# Patient Record
Sex: FEMALE | Race: WHITE | NOT HISPANIC OR LATINO | URBAN - METROPOLITAN AREA
[De-identification: names, ages, dates, MRNs, and addresses within clinical notes are randomized per-mention and may not be internally consistent; named-entity substitution may affect disease eponyms.]

---

## 2018-09-08 ENCOUNTER — EMERGENCY (EMERGENCY)
Facility: HOSPITAL | Age: 58
LOS: 1 days | Discharge: HOME | End: 2018-09-08
Attending: EMERGENCY MEDICINE | Admitting: EMERGENCY MEDICINE
Payer: SELF-PAY

## 2018-09-08 VITALS
HEART RATE: 73 BPM | DIASTOLIC BLOOD PRESSURE: 80 MMHG | OXYGEN SATURATION: 100 % | TEMPERATURE: 97 F | SYSTOLIC BLOOD PRESSURE: 145 MMHG | RESPIRATION RATE: 16 BRPM | WEIGHT: 134.92 LBS

## 2018-09-08 VITALS
SYSTOLIC BLOOD PRESSURE: 108 MMHG | HEART RATE: 72 BPM | OXYGEN SATURATION: 98 % | RESPIRATION RATE: 17 BRPM | DIASTOLIC BLOOD PRESSURE: 67 MMHG

## 2018-09-08 DIAGNOSIS — R20.0 ANESTHESIA OF SKIN: ICD-10-CM

## 2018-09-08 DIAGNOSIS — R07.89 OTHER CHEST PAIN: ICD-10-CM

## 2018-09-08 DIAGNOSIS — R20.2 PARESTHESIA OF SKIN: ICD-10-CM

## 2018-09-08 LAB
ALBUMIN SERPL ELPH-MCNC: 3.8 G/DL — SIGNIFICANT CHANGE UP (ref 3.4–5)
ALP SERPL-CCNC: 87 U/L — SIGNIFICANT CHANGE UP (ref 40–120)
ALT FLD-CCNC: 23 U/L — SIGNIFICANT CHANGE UP (ref 12–42)
ANION GAP SERPL CALC-SCNC: 8 MMOL/L — LOW (ref 9–16)
AST SERPL-CCNC: 17 U/L — SIGNIFICANT CHANGE UP (ref 15–37)
BASOPHILS NFR BLD AUTO: 0.9 % — SIGNIFICANT CHANGE UP (ref 0–2)
BILIRUB SERPL-MCNC: 0.3 MG/DL — SIGNIFICANT CHANGE UP (ref 0.2–1.2)
BUN SERPL-MCNC: 16 MG/DL — SIGNIFICANT CHANGE UP (ref 7–23)
CALCIUM SERPL-MCNC: 9 MG/DL — SIGNIFICANT CHANGE UP (ref 8.5–10.5)
CHLORIDE SERPL-SCNC: 106 MMOL/L — SIGNIFICANT CHANGE UP (ref 96–108)
CO2 SERPL-SCNC: 27 MMOL/L — SIGNIFICANT CHANGE UP (ref 22–31)
CREAT SERPL-MCNC: 0.84 MG/DL — SIGNIFICANT CHANGE UP (ref 0.5–1.3)
EOSINOPHIL NFR BLD AUTO: 2.7 % — SIGNIFICANT CHANGE UP (ref 0–6)
GLUCOSE SERPL-MCNC: 98 MG/DL — SIGNIFICANT CHANGE UP (ref 70–99)
HCT VFR BLD CALC: 39.7 % — SIGNIFICANT CHANGE UP (ref 34.5–45)
HGB BLD-MCNC: 13.4 G/DL — SIGNIFICANT CHANGE UP (ref 11.5–15.5)
IMM GRANULOCYTES NFR BLD AUTO: 0.1 % — SIGNIFICANT CHANGE UP (ref 0–1.5)
LYMPHOCYTES # BLD AUTO: 25.9 % — SIGNIFICANT CHANGE UP (ref 13–44)
MCHC RBC-ENTMCNC: 30.8 PG — SIGNIFICANT CHANGE UP (ref 27–34)
MCHC RBC-ENTMCNC: 33.8 G/DL — SIGNIFICANT CHANGE UP (ref 32–36)
MCV RBC AUTO: 91.3 FL — SIGNIFICANT CHANGE UP (ref 80–100)
MONOCYTES NFR BLD AUTO: 6.6 % — SIGNIFICANT CHANGE UP (ref 2–14)
NEUTROPHILS NFR BLD AUTO: 63.8 % — SIGNIFICANT CHANGE UP (ref 43–77)
PLATELET # BLD AUTO: 307 K/UL — SIGNIFICANT CHANGE UP (ref 150–400)
POTASSIUM SERPL-MCNC: 3.9 MMOL/L — SIGNIFICANT CHANGE UP (ref 3.5–5.3)
POTASSIUM SERPL-SCNC: 3.9 MMOL/L — SIGNIFICANT CHANGE UP (ref 3.5–5.3)
PROT SERPL-MCNC: 7.3 G/DL — SIGNIFICANT CHANGE UP (ref 6.4–8.2)
RBC # BLD: 4.35 M/UL — SIGNIFICANT CHANGE UP (ref 3.8–5.2)
RBC # FLD: 11.7 % — SIGNIFICANT CHANGE UP (ref 10.3–16.9)
SODIUM SERPL-SCNC: 141 MMOL/L — SIGNIFICANT CHANGE UP (ref 132–145)
TROPONIN I SERPL-MCNC: <0.017 NG/ML — LOW (ref 0.02–0.06)
TROPONIN I SERPL-MCNC: <0.017 NG/ML — LOW (ref 0.02–0.06)
WBC # BLD: 7.9 K/UL — SIGNIFICANT CHANGE UP (ref 3.8–10.5)
WBC # FLD AUTO: 7.9 K/UL — SIGNIFICANT CHANGE UP (ref 3.8–10.5)

## 2018-09-08 PROCEDURE — 71045 X-RAY EXAM CHEST 1 VIEW: CPT | Mod: 26

## 2018-09-08 PROCEDURE — 93010 ELECTROCARDIOGRAM REPORT: CPT | Mod: 76

## 2018-09-08 PROCEDURE — 99284 EMERGENCY DEPT VISIT MOD MDM: CPT | Mod: 25

## 2018-09-08 RX ORDER — ASPIRIN/CALCIUM CARB/MAGNESIUM 324 MG
81 TABLET ORAL ONCE
Qty: 0 | Refills: 0 | Status: COMPLETED | OUTPATIENT
Start: 2018-09-08 | End: 2018-09-08

## 2018-09-08 RX ORDER — SODIUM CHLORIDE 9 MG/ML
3 INJECTION INTRAMUSCULAR; INTRAVENOUS; SUBCUTANEOUS ONCE
Qty: 0 | Refills: 0 | Status: COMPLETED | OUTPATIENT
Start: 2018-09-08 | End: 2018-09-08

## 2018-09-08 RX ORDER — ESCITALOPRAM OXALATE 10 MG/1
1 TABLET, FILM COATED ORAL
Qty: 0 | Refills: 0 | COMMUNITY

## 2018-09-08 RX ADMIN — SODIUM CHLORIDE 3 MILLILITER(S): 9 INJECTION INTRAMUSCULAR; INTRAVENOUS; SUBCUTANEOUS at 15:21

## 2018-09-08 RX ADMIN — Medication 81 MILLIGRAM(S): at 15:21

## 2018-09-08 NOTE — ED ADULT NURSE NOTE - OBJECTIVE STATEMENT
pt appears in no distress. denies all pain and shortness of breath. states minimal tingling in left hand. hx of herniated disks in lower back.

## 2018-09-08 NOTE — ED PROVIDER NOTE - DIAGNOSTIC INTERPRETATION
Interpreted by MD Dr. Moreau   Chest x-ray, 1 view  Lungs clear, heart shadow normal, bony structures normal, no free air under diaphragm, no PTX

## 2018-09-08 NOTE — ED ADULT TRIAGE NOTE - CHIEF COMPLAINT QUOTE
c/o chest pain that has resolved and left arm numbness starting 30 minutes ago, sent from urgent care

## 2018-09-08 NOTE — ED ADULT NURSE NOTE - NSIMPLEMENTINTERV_GEN_ALL_ED
Implemented All Universal Safety Interventions:  [place] to call system. Call bell, personal items and telephone within reach. Instruct patient to call for assistance. Room bathroom lighting operational. Non-slip footwear when patient is off stretcher. Physically safe environment: no spills, clutter or unnecessary equipment. Stretcher in lowest position, wheels locked, appropriate side rails in place.

## 2018-09-08 NOTE — ED PROVIDER NOTE - MEDICAL DECISION MAKING DETAILS
uncomplicated ed course.   Strict prompt return precautions to ER discussed for any worsening or new symptoms, pt verbalized understanding. The patient tolerated PO fluids. The patient's symptoms progressively improved throughout the ED stay. At the time of discharge from the Emergency Department, the patient is alert with fluent appropriate speech and ambulatory without difficulty.  A medical screening examination was performed and no emergency medical condition was identified. dc home w/ partner who was present throughout the ed stay

## 2018-09-08 NOTE — ED PROVIDER NOTE - NEURO NEGATIVE STATEMENT, MLM
no loss of consciousness, no gait abnormality, no headache,  no weakness. no speech or visual changes.

## 2018-09-08 NOTE — ED PROVIDER NOTE - OBJECTIVE STATEMENT
56 y/o female with no known PMHx presents to the ED with c/o of CP and arm weakness. Pt states that she was on the computer between 1-1:30PM today when she started to feel a sharp pain that waxes and wanes and lasts for 1-2 seconds towards the left side of the central chest. Her arm left arm was weak, numb and had a tingling sensation. She believed it to be sx of a MI, so she went to her local Urgent Care where she got an EKG. She was advised to come to the ER because something did not look "100% right." She walked to the ED with her boyfriend and states that the walk was fine. Since her arrival to the ED, she states that she has felt the pain x4-5. She denies any sx  of face drool or numbness, and no difficulty speaking. She states she has been feeling fine for the past couple of weeks and relates her fatigue and weakness to the heat. She had a similar sx 8 years ago and believed it to be a MI, but stated it was a panic attack. She denies being a smoker and bikes (with a helmet) 1-2 hours a week. She has a Surgical hx of torn meniscus 5 years ago. 58 y/o female with no known PMHx presents to the ED with c/o of CP and arm weakness. Pt states that she was on the computer between 1-1:30PM today when she started to feel a sharp pain that waxes and wanes and lasts for 1-2 seconds towards the left side of the central chest. Her arm left arm was weak, numb and had a tingling sensation. She believed it to be sx of a MI, so she went to her local Urgent Care where she got an EKG. She was advised to come to the ER because something did not look "100% right." She walked to the ED with her boyfriend and states that the walk was fine. Since her arrival to the ED, she states that she has felt the pain x4-5. She denies any sx  of face drool or numbness, and no difficulty speaking. She states she has been feeling fine for the past couple of weeks and relates her fatigue and weakness to the heat. She had a similar sx 8 years ago and believed it to be a MI, but stated it was a panic attack. She denies being a smoker and bikes (with a helmet) 1-2 hours a week. She has a Surgical hx of torn meniscus 5 years ago. She is currently on Lexapro 10mg. 58 y/o female with PMHx of panic attacks (takes Lexapro) presents to the ED with c/o of CP and arm weakness. Pt states that she was on the computer between 1-1:30PM today when she started to feel a sharp pain that waxes and wanes and lasts for 1-2 seconds towards the left side of the central chest. Her arm left arm was weak, numb and had a tingling sensation. She believed it to be sx of a MI, so she went to her local Urgent Care where she got an EKG. She was advised to come to the ER because something did not look "100% right." She walked to the ED with her boyfriend and states that the walk was fine. Since her arrival to the ED, she states that she has felt the pain x4-5. She denies any sx  of face drool or numbness, and no difficulty speaking. She states she has been feeling fine for the past couple of weeks and relates her fatigue and weakness to the heat. She had a similar sx 8 years ago and believed it to be a MI, but stated it was a panic attack. She denies being a smoker and bikes (with a helmet) 1-2 hours a week. She has a Surgical hx of torn meniscus 5 years ago. 58 y/o female with PMHx of panic attacks (takes Lexapro) presents to the ED with c/o of CP and left arm paresthesias. Pt states she was on the computer 1:30PM today when she started to feel a sharp pain that waxes and wanes and lasts for 1-2 seconds towards the left side of the central chest. Her arm left arm was weak, numb and had a tingling sensation. She believed it to be sx of a MI, so she went to her local Urgent Care where she got an EKG. She was advised to come to the ER because something did not look "100% right." She walked to the ED with her boyfriend and states that the walk was fine. Since her arrival to the ED, she states that she has felt the pain x4-5. She denies any sx  of face drool or numbness, and no difficulty speaking. She states she has been feeling fine for the past couple of weeks and relates her fatigue and weakness to the heat. She had a similar sx 8 years ago and believed it to be a MI, but stated it was a panic attack. She denies being a smoker and bikes (with a helmet) 1-2 hours a week. She has a Surgical hx of torn meniscus 5 years ago. 58 y/o female with PMHx of panic attacks (takes Lexapro) presents to the ED with c/o of CP and left arm paresthesias. Pt states she was on the computer 1:30PM today when she suddenly began experiencing numbness and tingling in her left arm, followed by intermittent 1-2 second episodes of left-sided chest pain. Pt was concerned for MI, prompting her and partner to walk to urgent care. Pt had EKG performed at urgent care, and was referred to ED for further evaluation due to abnormal EKG findings. She walked to the ED with her boyfriend and was able to walk without issue. Pt states she has experienced 4-5 episodes of chest pain since arriving to the ED. She reports fatigue for the past week, but believes it was due to heat. She had similar chest pain 8 years ago due to a panic attack. States she rides her bicycle for a few hours per week, without eliciting symptoms. Denies tobacco use. Denies facial droop, drooling, and difficulty speaking. 58 y/o female with PMHx of panic attacks (takes Lexapro) presents to the ED with her boyfriend c/o of CP and left arm tingling . Pt states she was on the computer 1:30PM today when she began experiencing  tingling in her left arm, followed by intermittent 1-2 second episodes of left-sided focal (indicates site with one finger) chest pain.no associated radiation of pain no dizziness no lightheadedness no n/v no diaphoresis  Pt was concerned for MI (based on watching oprah) , prompting her and partner to walk to urgent care. Pt had EKG performed at urgent care, and was referred to ED for further evaluation She walked to the ED with her boyfriend and was able to walk without and concerning sx.. Pt states she has experienced 4-5 episodes of chest pain since arriving to the ED each episode lasting 1-2 seconds States she rides her bicycle for a few hours per week, without eliciting symptoms. Denies tobacco use. no cardiac risk factors  boyfriend states pt appears well.

## 2018-09-10 DIAGNOSIS — S83.209A UNSPECIFIED TEAR OF UNSPECIFIED MENISCUS, CURRENT INJURY, UNSPECIFIED KNEE, INITIAL ENCOUNTER: Chronic | ICD-10-CM

## 2023-01-31 ENCOUNTER — APPOINTMENT (OUTPATIENT)
Dept: ORTHOPEDIC SURGERY | Facility: CLINIC | Age: 63
End: 2023-01-31

## 2023-01-31 PROBLEM — Z00.00 ENCOUNTER FOR PREVENTIVE HEALTH EXAMINATION: Status: ACTIVE | Noted: 2023-01-31

## 2023-02-06 ENCOUNTER — APPOINTMENT (OUTPATIENT)
Dept: ORTHOPEDIC SURGERY | Facility: CLINIC | Age: 63
End: 2023-02-06
Payer: MEDICAID

## 2023-02-06 ENCOUNTER — TRANSCRIPTION ENCOUNTER (OUTPATIENT)
Age: 63
End: 2023-02-06

## 2023-02-06 VITALS — HEIGHT: 67 IN | RESPIRATION RATE: 16 BRPM | BODY MASS INDEX: 20.4 KG/M2 | WEIGHT: 130 LBS

## 2023-02-06 DIAGNOSIS — Z78.9 OTHER SPECIFIED HEALTH STATUS: ICD-10-CM

## 2023-02-06 DIAGNOSIS — M17.0 BILATERAL PRIMARY OSTEOARTHRITIS OF KNEE: ICD-10-CM

## 2023-02-06 DIAGNOSIS — Z82.62 FAMILY HISTORY OF OSTEOPOROSIS: ICD-10-CM

## 2023-02-06 DIAGNOSIS — Z80.0 FAMILY HISTORY OF MALIGNANT NEOPLASM OF DIGESTIVE ORGANS: ICD-10-CM

## 2023-02-06 DIAGNOSIS — Z82.61 FAMILY HISTORY OF ARTHRITIS: ICD-10-CM

## 2023-02-06 DIAGNOSIS — M51.26 OTHER INTERVERTEBRAL DISC DISPLACEMENT, LUMBAR REGION: ICD-10-CM

## 2023-02-06 PROCEDURE — 73564 X-RAY EXAM KNEE 4 OR MORE: CPT | Mod: 50

## 2023-02-06 PROCEDURE — 99203 OFFICE O/P NEW LOW 30 MIN: CPT

## 2023-02-06 RX ORDER — CELECOXIB 200 MG/1
200 CAPSULE ORAL
Qty: 30 | Refills: 2 | Status: ACTIVE | COMMUNITY
Start: 2023-02-06 | End: 1900-01-01

## 2023-04-11 ENCOUNTER — APPOINTMENT (OUTPATIENT)
Dept: ORTHOPEDIC SURGERY | Facility: CLINIC | Age: 63
End: 2023-04-11
Payer: MEDICAID

## 2023-04-11 VITALS — BODY MASS INDEX: 20.09 KG/M2 | WEIGHT: 128 LBS | HEIGHT: 67 IN

## 2023-04-11 DIAGNOSIS — M77.02 MEDIAL EPICONDYLITIS, LEFT ELBOW: ICD-10-CM

## 2023-04-11 DIAGNOSIS — M75.41 IMPINGEMENT SYNDROME OF RIGHT SHOULDER: ICD-10-CM

## 2023-04-11 DIAGNOSIS — M77.01 MEDIAL EPICONDYLITIS, RIGHT ELBOW: ICD-10-CM

## 2023-04-11 PROCEDURE — 99214 OFFICE O/P EST MOD 30 MIN: CPT

## 2023-04-11 RX ORDER — MELOXICAM 15 MG/1
15 TABLET ORAL
Qty: 30 | Refills: 1 | Status: ACTIVE | COMMUNITY
Start: 2023-04-11 | End: 1900-01-01

## 2023-04-11 RX ORDER — ESCITALOPRAM OXALATE 10 MG/1
10 TABLET, FILM COATED ORAL
Refills: 0 | Status: ACTIVE | COMMUNITY

## 2024-04-08 ENCOUNTER — EMERGENCY (EMERGENCY)
Facility: HOSPITAL | Age: 64
LOS: 1 days | Discharge: HOME | End: 2024-04-08
Admitting: EMERGENCY MEDICINE
Payer: MEDICARE

## 2024-04-08 VITALS
SYSTOLIC BLOOD PRESSURE: 111 MMHG | TEMPERATURE: 98 F | HEART RATE: 61 BPM | OXYGEN SATURATION: 100 % | DIASTOLIC BLOOD PRESSURE: 78 MMHG | RESPIRATION RATE: 16 BRPM

## 2024-04-08 VITALS
OXYGEN SATURATION: 99 % | DIASTOLIC BLOOD PRESSURE: 81 MMHG | RESPIRATION RATE: 18 BRPM | SYSTOLIC BLOOD PRESSURE: 130 MMHG | WEIGHT: 134.92 LBS | TEMPERATURE: 98 F | HEART RATE: 73 BPM

## 2024-04-08 DIAGNOSIS — S83.209A UNSPECIFIED TEAR OF UNSPECIFIED MENISCUS, CURRENT INJURY, UNSPECIFIED KNEE, INITIAL ENCOUNTER: Chronic | ICD-10-CM

## 2024-04-08 LAB
ALBUMIN SERPL ELPH-MCNC: 3.7 G/DL — SIGNIFICANT CHANGE UP (ref 3.4–5)
ALP SERPL-CCNC: 82 U/L — SIGNIFICANT CHANGE UP (ref 40–120)
ALT FLD-CCNC: 30 U/L — SIGNIFICANT CHANGE UP (ref 12–42)
ANION GAP SERPL CALC-SCNC: 9 MMOL/L — SIGNIFICANT CHANGE UP (ref 9–16)
APPEARANCE UR: CLEAR — SIGNIFICANT CHANGE UP
APTT BLD: 33.5 SEC — SIGNIFICANT CHANGE UP (ref 24.5–35.6)
AST SERPL-CCNC: 26 U/L — SIGNIFICANT CHANGE UP (ref 15–37)
BACTERIA # UR AUTO: ABNORMAL /HPF
BASOPHILS # BLD AUTO: 0.05 K/UL — SIGNIFICANT CHANGE UP (ref 0–0.2)
BASOPHILS NFR BLD AUTO: 0.8 % — SIGNIFICANT CHANGE UP (ref 0–2)
BILIRUB SERPL-MCNC: 0.2 MG/DL — SIGNIFICANT CHANGE UP (ref 0.2–1.2)
BILIRUB UR-MCNC: NEGATIVE — SIGNIFICANT CHANGE UP
BUN SERPL-MCNC: 20 MG/DL — SIGNIFICANT CHANGE UP (ref 7–23)
CALCIUM SERPL-MCNC: 9.2 MG/DL — SIGNIFICANT CHANGE UP (ref 8.5–10.5)
CHLORIDE SERPL-SCNC: 105 MMOL/L — SIGNIFICANT CHANGE UP (ref 96–108)
CO2 SERPL-SCNC: 27 MMOL/L — SIGNIFICANT CHANGE UP (ref 22–31)
COLOR SPEC: YELLOW — SIGNIFICANT CHANGE UP
CREAT SERPL-MCNC: 1.03 MG/DL — SIGNIFICANT CHANGE UP (ref 0.5–1.3)
D DIMER BLD IA.RAPID-MCNC: <187 NG/ML DDU — SIGNIFICANT CHANGE UP
DIFF PNL FLD: NEGATIVE — SIGNIFICANT CHANGE UP
EGFR: 60 ML/MIN/1.73M2 — SIGNIFICANT CHANGE UP
EOSINOPHIL # BLD AUTO: 0.26 K/UL — SIGNIFICANT CHANGE UP (ref 0–0.5)
EOSINOPHIL NFR BLD AUTO: 4.3 % — SIGNIFICANT CHANGE UP (ref 0–6)
GLUCOSE SERPL-MCNC: 77 MG/DL — SIGNIFICANT CHANGE UP (ref 70–99)
GLUCOSE UR QL: NEGATIVE MG/DL — SIGNIFICANT CHANGE UP
HCT VFR BLD CALC: 41.6 % — SIGNIFICANT CHANGE UP (ref 34.5–45)
HGB BLD-MCNC: 13.9 G/DL — SIGNIFICANT CHANGE UP (ref 11.5–15.5)
IMM GRANULOCYTES NFR BLD AUTO: 0.2 % — SIGNIFICANT CHANGE UP (ref 0–0.9)
INR BLD: 1 — SIGNIFICANT CHANGE UP (ref 0.85–1.18)
KETONES UR-MCNC: NEGATIVE MG/DL — SIGNIFICANT CHANGE UP
LACTATE BLDV-MCNC: 1.5 MMOL/L — SIGNIFICANT CHANGE UP (ref 0.5–2)
LEUKOCYTE ESTERASE UR-ACNC: ABNORMAL
LYMPHOCYTES # BLD AUTO: 2.29 K/UL — SIGNIFICANT CHANGE UP (ref 1–3.3)
LYMPHOCYTES # BLD AUTO: 37.9 % — SIGNIFICANT CHANGE UP (ref 13–44)
MCHC RBC-ENTMCNC: 31.2 PG — SIGNIFICANT CHANGE UP (ref 27–34)
MCHC RBC-ENTMCNC: 33.4 GM/DL — SIGNIFICANT CHANGE UP (ref 32–36)
MCV RBC AUTO: 93.3 FL — SIGNIFICANT CHANGE UP (ref 80–100)
MONOCYTES # BLD AUTO: 0.45 K/UL — SIGNIFICANT CHANGE UP (ref 0–0.9)
MONOCYTES NFR BLD AUTO: 7.5 % — SIGNIFICANT CHANGE UP (ref 2–14)
NEUTROPHILS # BLD AUTO: 2.98 K/UL — SIGNIFICANT CHANGE UP (ref 1.8–7.4)
NEUTROPHILS NFR BLD AUTO: 49.3 % — SIGNIFICANT CHANGE UP (ref 43–77)
NITRITE UR-MCNC: NEGATIVE — SIGNIFICANT CHANGE UP
NRBC # BLD: 0 /100 WBCS — SIGNIFICANT CHANGE UP (ref 0–0)
PH UR: 7 — SIGNIFICANT CHANGE UP (ref 5–8)
PLATELET # BLD AUTO: 334 K/UL — SIGNIFICANT CHANGE UP (ref 150–400)
POTASSIUM SERPL-MCNC: 4.2 MMOL/L — SIGNIFICANT CHANGE UP (ref 3.5–5.3)
POTASSIUM SERPL-SCNC: 4.2 MMOL/L — SIGNIFICANT CHANGE UP (ref 3.5–5.3)
PROT SERPL-MCNC: 7.3 G/DL — SIGNIFICANT CHANGE UP (ref 6.4–8.2)
PROT UR-MCNC: NEGATIVE MG/DL — SIGNIFICANT CHANGE UP
PROTHROM AB SERPL-ACNC: 11 SEC — SIGNIFICANT CHANGE UP (ref 9.5–13)
RBC # BLD: 4.46 M/UL — SIGNIFICANT CHANGE UP (ref 3.8–5.2)
RBC # FLD: 12 % — SIGNIFICANT CHANGE UP (ref 10.3–14.5)
RBC CASTS # UR COMP ASSIST: 0 /HPF — SIGNIFICANT CHANGE UP (ref 0–4)
SODIUM SERPL-SCNC: 141 MMOL/L — SIGNIFICANT CHANGE UP (ref 132–145)
SP GR SPEC: 1 — SIGNIFICANT CHANGE UP (ref 1–1.03)
UROBILINOGEN FLD QL: 0.2 MG/DL — SIGNIFICANT CHANGE UP (ref 0.2–1)
WBC # BLD: 6.04 K/UL — SIGNIFICANT CHANGE UP (ref 3.8–10.5)
WBC # FLD AUTO: 6.04 K/UL — SIGNIFICANT CHANGE UP (ref 3.8–10.5)
WBC UR QL: 15 /HPF — HIGH (ref 0–5)

## 2024-04-08 PROCEDURE — 74176 CT ABD & PELVIS W/O CONTRAST: CPT | Mod: 26,MC

## 2024-04-08 PROCEDURE — 99285 EMERGENCY DEPT VISIT HI MDM: CPT

## 2024-04-08 PROCEDURE — 71275 CT ANGIOGRAPHY CHEST: CPT | Mod: 26,MC

## 2024-04-08 RX ORDER — SODIUM CHLORIDE 9 MG/ML
1000 INJECTION INTRAMUSCULAR; INTRAVENOUS; SUBCUTANEOUS ONCE
Refills: 0 | Status: COMPLETED | OUTPATIENT
Start: 2024-04-08 | End: 2024-04-08

## 2024-04-08 RX ORDER — CEFPODOXIME PROXETIL 100 MG
1 TABLET ORAL
Qty: 20 | Refills: 0
Start: 2024-04-08 | End: 2024-04-17

## 2024-04-08 RX ORDER — IBUPROFEN 200 MG
1 TABLET ORAL
Qty: 28 | Refills: 0
Start: 2024-04-08 | End: 2024-04-14

## 2024-04-08 RX ORDER — KETOROLAC TROMETHAMINE 30 MG/ML
30 SYRINGE (ML) INJECTION ONCE
Refills: 0 | Status: DISCONTINUED | OUTPATIENT
Start: 2024-04-08 | End: 2024-04-08

## 2024-04-08 RX ORDER — MORPHINE SULFATE 50 MG/1
4 CAPSULE, EXTENDED RELEASE ORAL ONCE
Refills: 0 | Status: DISCONTINUED | OUTPATIENT
Start: 2024-04-08 | End: 2024-04-08

## 2024-04-08 RX ORDER — METHOCARBAMOL 500 MG/1
2 TABLET, FILM COATED ORAL
Qty: 24 | Refills: 0
Start: 2024-04-08 | End: 2024-04-11

## 2024-04-08 RX ADMIN — Medication 30 MILLIGRAM(S): at 11:20

## 2024-04-08 RX ADMIN — MORPHINE SULFATE 4 MILLIGRAM(S): 50 CAPSULE, EXTENDED RELEASE ORAL at 10:04

## 2024-04-08 RX ADMIN — SODIUM CHLORIDE 1000 MILLILITER(S): 9 INJECTION INTRAMUSCULAR; INTRAVENOUS; SUBCUTANEOUS at 11:21

## 2024-04-08 RX ADMIN — SODIUM CHLORIDE 1000 MILLILITER(S): 9 INJECTION INTRAMUSCULAR; INTRAVENOUS; SUBCUTANEOUS at 10:03

## 2024-04-08 NOTE — ED PROVIDER NOTE - PHYSICAL EXAMINATION
VITAL SIGNS: I have reviewed nursing notes and confirm.  CONSTITUTIONAL: Well-developed; well-nourished; in no acute distress.  SKIN: Skin is warm and dry, no acute rash.  HEAD: Normocephalic; atraumatic.  NECK: Supple; non tender.  CARD: S1, S2 normal; no murmurs, gallops, or rubs. Regular rate and rhythm.  RESP: No wheezes, rales or rhonchi.  SPINE: No midline spinal ttp; no CVA ttp; no reproducible pain in patient's reported area: L lateral spine from ribs 9 to 12. FROM of the spine.  ABD: Soft; non-distended; non-tender; no rebound or guarding.  EXT: Normal ROM. No clubbing, cyanosis or edema.  NEURO: Alert, oriented. Grossly unremarkable. [place], normal tone, no gross motor or sensory changes. Fluent speech.   PSYCH: Cooperative, appropriate. Mood and affect wnl.

## 2024-04-08 NOTE — ED ADULT NURSE NOTE - OBJECTIVE STATEMENT
c/o left sided flank pain non radiating, with no urinary symptoms which started last night- h/o anxiety takes Lexapro

## 2024-04-08 NOTE — ED PROVIDER NOTE - OBJECTIVE STATEMENT
63-year-old female, past medical history of depression and anxiety, presenting to the emergency room complaining of left-sided flank/mid back pain since this morning.  Patient reports waking with mild pain, however for the past hour it has increased which prompted her to come to the emergency room.  She describes the pain as coming in waves with periods where it has improved versus periods where it worsens; however it never fully resolves.  The pain primarily stays in the 1 area and does not radiate.  She has noted some pain with deep breathing in that region and reports feeling slightly short of breath when the pain is more significant.  She denies similar pain like this in the past.  Further denies nausea, vomiting, urinary symptoms, headaches, dizziness, chest pain, fevers or significant cough.

## 2024-04-08 NOTE — ED PROVIDER NOTE - PROGRESS NOTE DETAILS
No evidence of PE on CTA or renal stone on CT.  Labs reviewed and no other abnormality noted except small leukocytes and 15 white blood cells in urinalysis.  Shared decision making involved and after discussing with patient, she would prefer to be started for possible pyelonephritis treatment.  I explained to the patient she will need to call the ER to follow-up her results in 2 days in order to determine if she needs to continue the antibiotics.  Will also prescribe methocarbamol and ibuprofen for symptomatic pain relief.  Patient instructed to follow-up with her primary care doctors and to return for any worsening symptoms.  She is stable on discharge and leaves in no acute distress.

## 2024-04-08 NOTE — ED PROVIDER NOTE - CLINICAL SUMMARY MEDICAL DECISION MAKING FREE TEXT BOX
63-year-old female, past medical history of depression and anxiety, presenting to the emergency room complaining of left-sided flank/mid back pain since this morning. Patient region of pain is noted to be lateral to her spine, along the 9th down to the 12th ribs.  She does not have distinctive tenderness in the CVA region and the abdomen is soft and completely nontender.  Vitals in triage are noted to be stable with a pulse ox 99% on room air and heart rate of 73.  Differential includes pneumonia versus renal stone versus muscular pain versus PE.  Plan to obtain medical labs, give IV fluids and pain control, and obtain CT angio chest and CT abdomen pelvis.  Will reassess and dispo pending medical workup.

## 2024-04-08 NOTE — ED ADULT TRIAGE NOTE - BP NONINVASIVE DIASTOLIC (MM HG)
progress to home  with assist from family, pending stage 2 OR tentative for 5/31 81 acute rehab/rehabilitation facility

## 2024-04-08 NOTE — ED PROVIDER NOTE - NS ED ROS FT
+back/flank pain  Denies fevers, chills, nausea, vomiting, diarrhea, constipation, abdominal pain, urinary symptoms, chest pain, palpitations, syncope/near syncope, headache, weakness, numbness, focal deficits, visual changes, gait or balance changes, dizziness

## 2024-04-08 NOTE — ED PROVIDER NOTE - PATIENT PORTAL LINK FT
You can access the FollowMyHealth Patient Portal offered by [facility] by registering at the following website: [email or web address]. By joining [employer]’s FollowMyHealth portal, you will also be able to view your health information using other applications (apps) compatible with our system.

## 2024-04-08 NOTE — ED PROVIDER NOTE - NSFOLLOWUPINSTRUCTIONS_ED_ALL_ED_FT
You will be empirically treated for a possible kidney infection with antibiotics.  Please take as prescribed.  He may take methocarbamol and ibuprofen as needed for pain relief.  Return to the ER for worsening symptoms.  Please call the ER back in 2 days to follow-up your results.  Also follow-up with your primary care doctor as well.    Flank Pain, Adult  Flank pain is pain that is located on the side of the body between the upper abdomen and the spine. This area is called the flank. The pain may occur over a short period of time (acute), or it may be long-term or recurring (chronic). It may be mild or severe. Flank pain can be caused by many things, including:  Muscle soreness or injury.  Kidney infection, kidney stones, or kidney disease.  Stress.  A disease of the spine (vertebral disk disease).  A lung infection (pneumonia).  Fluid around the lungs (pulmonary edema).  A skin rash caused by the chickenpox virus (shingles).  Tumors that affect the back of the abdomen.  Gallbladder disease.  Follow these instructions at home:  A comparison of three sample cups showing dark yellow, yellow, and pale yellow urine.  Drink enough fluid to keep your urine pale yellow.  Rest as told by your health care provider.  Take over-the-counter and prescription medicines only as told by your health care provider.  Keep a journal to track what has caused your flank pain and what has made it feel better.  Keep all follow-up visits. This is important.  Contact a health care provider if:  Your pain is not controlled with medicine.  You have new symptoms.  Your pain gets worse.  Your symptoms last longer than 2–3 days.  You have trouble urinating or you are urinating very frequently.  Get help right away if:  You have trouble breathing or you are short of breath.  Your abdomen hurts or it is swollen or red.  You have nausea or vomiting.  You feel faint, or you faint.  You have blood in your urine.  You have flank pain and a fever.  These symptoms may represent a serious problem that is an emergency. Do not wait to see if the symptoms will go away. Get medical help right away. Call your local emergency services (911 in the [place] Do not drive yourself to the hospital.    Summary  Flank pain is pain that is located on the side of the body between the upper abdomen and the spine.  The pain may occur over a short period of time (acute), or it may be long-term or recurring (chronic). It may be mild or severe.  Flank pain can be caused by many things.  Contact your health care provider if your symptoms get worse or last longer than 2–3 days.  This information is not intended to replace advice given to you by your health care provider. Make sure you discuss any questions you have with your health care provider.

## 2024-04-08 NOTE — ED ADULT NURSE NOTE - NSFALLUNIVINTERV_ED_ALL_ED
Bed/Stretcher in lowest position, wheels locked, appropriate side rails in place/Call bell, personal items and telephone in reach/Instruct patient to call for assistance before getting out of bed/chair/stretcher/Non-slip footwear applied when patient is off stretcher/[place] to call system/Physically safe environment - no spills, clutter or unnecessary equipment/Purposeful proactive rounding/Room/bathroom lighting operational, light cord in reach

## 2024-04-09 LAB
CULTURE RESULTS: SIGNIFICANT CHANGE UP
SPECIMEN SOURCE: SIGNIFICANT CHANGE UP

## 2024-04-10 DIAGNOSIS — M54.9 DORSALGIA, UNSPECIFIED: ICD-10-CM

## 2024-04-10 DIAGNOSIS — R10.9 UNSPECIFIED ABDOMINAL PAIN: ICD-10-CM

## 2024-04-10 DIAGNOSIS — F41.9 ANXIETY DISORDER, UNSPECIFIED: ICD-10-CM

## 2024-04-10 DIAGNOSIS — F32.A DEPRESSION, UNSPECIFIED: ICD-10-CM

## 2024-04-11 NOTE — ED POST DISCHARGE NOTE - OTHER COMMUNICATION
4/11/23 Patient called for culture result. Negative. No dysuria, urgency, frequency, fever. I recommended patient stop antibiotics. Saw PMD and has appt with sports medicine next week.
